# Patient Record
Sex: MALE | Race: WHITE | Employment: OTHER | URBAN - METROPOLITAN AREA
[De-identification: names, ages, dates, MRNs, and addresses within clinical notes are randomized per-mention and may not be internally consistent; named-entity substitution may affect disease eponyms.]

---

## 2022-11-09 ENCOUNTER — ANESTHESIA EVENT (OUTPATIENT)
Dept: ENDOSCOPY | Age: 77
End: 2022-11-09
Payer: MEDICARE

## 2022-11-10 ENCOUNTER — HOSPITAL ENCOUNTER (OUTPATIENT)
Age: 77
Setting detail: OUTPATIENT SURGERY
Discharge: HOME | End: 2022-11-10
Attending: INTERNAL MEDICINE | Admitting: INTERNAL MEDICINE
Payer: MEDICARE

## 2022-11-10 ENCOUNTER — ANESTHESIA (OUTPATIENT)
Dept: ENDOSCOPY | Age: 77
End: 2022-11-10
Payer: MEDICARE

## 2022-11-10 VITALS
BODY MASS INDEX: 31.83 KG/M2 | RESPIRATION RATE: 16 BRPM | TEMPERATURE: 96.4 F | HEIGHT: 72 IN | WEIGHT: 235 LBS | OXYGEN SATURATION: 96 % | DIASTOLIC BLOOD PRESSURE: 83 MMHG | HEART RATE: 54 BPM | SYSTOLIC BLOOD PRESSURE: 114 MMHG

## 2022-11-10 PROCEDURE — 2720000010 HC SURG SUPPLY STERILE: Performed by: INTERNAL MEDICINE

## 2022-11-10 PROCEDURE — 3609155800 HC ENTEROSCOPY WITH INTERVENTION: Performed by: INTERNAL MEDICINE

## 2022-11-10 PROCEDURE — 2500000003 HC RX 250 WO HCPCS: Performed by: NURSE ANESTHETIST, CERTIFIED REGISTERED

## 2022-11-10 PROCEDURE — 2580000003 HC RX 258: Performed by: FAMILY MEDICINE

## 2022-11-10 PROCEDURE — 6360000002 HC RX W HCPCS: Performed by: NURSE ANESTHETIST, CERTIFIED REGISTERED

## 2022-11-10 PROCEDURE — 3700000001 HC ADD 15 MINUTES (ANESTHESIA): Performed by: INTERNAL MEDICINE

## 2022-11-10 PROCEDURE — 2500000003 HC RX 250 WO HCPCS: Performed by: INTERNAL MEDICINE

## 2022-11-10 PROCEDURE — 3700000000 HC ANESTHESIA ATTENDED CARE: Performed by: INTERNAL MEDICINE

## 2022-11-10 PROCEDURE — 7100000010 HC PHASE II RECOVERY - FIRST 15 MIN: Performed by: INTERNAL MEDICINE

## 2022-11-10 PROCEDURE — 2709999900 HC NON-CHARGEABLE SUPPLY: Performed by: INTERNAL MEDICINE

## 2022-11-10 PROCEDURE — 7100000011 HC PHASE II RECOVERY - ADDTL 15 MIN: Performed by: INTERNAL MEDICINE

## 2022-11-10 RX ORDER — METOPROLOL SUCCINATE 50 MG/1
TABLET, EXTENDED RELEASE ORAL
COMMUNITY
Start: 2017-12-21

## 2022-11-10 RX ORDER — LISINOPRIL 5 MG/1
5 TABLET ORAL DAILY
COMMUNITY
Start: 2017-12-21

## 2022-11-10 RX ORDER — PROPOFOL 10 MG/ML
INJECTION, EMULSION INTRAVENOUS PRN
Status: DISCONTINUED | OUTPATIENT
Start: 2022-11-10 | End: 2022-11-10 | Stop reason: SDUPTHER

## 2022-11-10 RX ORDER — DIMENHYDRINATE 50 MG
TABLET ORAL
COMMUNITY

## 2022-11-10 RX ORDER — M-VIT,TX,IRON,MINS/CALC/FOLIC 27MG-0.4MG
1 TABLET ORAL DAILY
COMMUNITY

## 2022-11-10 RX ORDER — WARFARIN SODIUM 5 MG/1
10 TABLET ORAL DAILY
COMMUNITY
Start: 2018-06-19

## 2022-11-10 RX ORDER — LEVOTHYROXINE SODIUM 0.05 MG/1
50 TABLET ORAL DAILY
COMMUNITY
Start: 2018-05-31

## 2022-11-10 RX ORDER — SODIUM CHLORIDE, SODIUM LACTATE, POTASSIUM CHLORIDE, CALCIUM CHLORIDE 600; 310; 30; 20 MG/100ML; MG/100ML; MG/100ML; MG/100ML
INJECTION, SOLUTION INTRAVENOUS CONTINUOUS
Status: DISCONTINUED | OUTPATIENT
Start: 2022-11-10 | End: 2022-11-10 | Stop reason: HOSPADM

## 2022-11-10 RX ORDER — HYDROCHLOROTHIAZIDE 12.5 MG/1
TABLET ORAL
COMMUNITY
Start: 2022-09-26

## 2022-11-10 RX ORDER — DIPHENHYDRAMINE HCL 25 MG
CAPSULE ORAL
COMMUNITY

## 2022-11-10 RX ORDER — LIDOCAINE HYDROCHLORIDE 20 MG/ML
INJECTION, SOLUTION INFILTRATION; PERINEURAL PRN
Status: DISCONTINUED | OUTPATIENT
Start: 2022-11-10 | End: 2022-11-10 | Stop reason: SDUPTHER

## 2022-11-10 RX ORDER — PROPOFOL 10 MG/ML
INJECTION, EMULSION INTRAVENOUS CONTINUOUS PRN
Status: DISCONTINUED | OUTPATIENT
Start: 2022-11-10 | End: 2022-11-10 | Stop reason: SDUPTHER

## 2022-11-10 RX ORDER — CELECOXIB 200 MG/1
CAPSULE ORAL
COMMUNITY
Start: 2017-12-21

## 2022-11-10 RX ORDER — WARFARIN SODIUM 10 MG/1
10 TABLET ORAL DAILY
COMMUNITY
Start: 2022-01-10

## 2022-11-10 RX ORDER — FINASTERIDE 5 MG/1
TABLET, FILM COATED ORAL
COMMUNITY
Start: 2018-02-26

## 2022-11-10 RX ORDER — OXYBUTYNIN CHLORIDE 5 MG/1
TABLET ORAL
COMMUNITY
Start: 2021-12-27

## 2022-11-10 RX ORDER — SODIUM CHLORIDE 0.9 % (FLUSH) 0.9 %
5-40 SYRINGE (ML) INJECTION PRN
Status: DISCONTINUED | OUTPATIENT
Start: 2022-11-10 | End: 2022-11-10 | Stop reason: HOSPADM

## 2022-11-10 RX ORDER — ATORVASTATIN CALCIUM 40 MG/1
TABLET, FILM COATED ORAL
COMMUNITY
Start: 2018-05-03

## 2022-11-10 RX ORDER — SODIUM CHLORIDE 0.9 % (FLUSH) 0.9 %
5-40 SYRINGE (ML) INJECTION EVERY 12 HOURS SCHEDULED
Status: DISCONTINUED | OUTPATIENT
Start: 2022-11-10 | End: 2022-11-10 | Stop reason: HOSPADM

## 2022-11-10 RX ORDER — SODIUM CHLORIDE 9 MG/ML
INJECTION, SOLUTION INTRAVENOUS PRN
Status: DISCONTINUED | OUTPATIENT
Start: 2022-11-10 | End: 2022-11-10 | Stop reason: HOSPADM

## 2022-11-10 RX ORDER — TAMSULOSIN HYDROCHLORIDE 0.4 MG/1
CAPSULE ORAL
COMMUNITY
Start: 2017-12-21

## 2022-11-10 RX ADMIN — LIDOCAINE HYDROCHLORIDE 100 MG: 20 INJECTION, SOLUTION INFILTRATION; PERINEURAL at 08:07

## 2022-11-10 RX ADMIN — SODIUM CHLORIDE, POTASSIUM CHLORIDE, SODIUM LACTATE AND CALCIUM CHLORIDE: 600; 310; 30; 20 INJECTION, SOLUTION INTRAVENOUS at 06:45

## 2022-11-10 RX ADMIN — PROPOFOL 30 MG: 10 INJECTION, EMULSION INTRAVENOUS at 08:07

## 2022-11-10 RX ADMIN — PROPOFOL 180 MCG/KG/MIN: 10 INJECTION, EMULSION INTRAVENOUS at 08:07

## 2022-11-10 ASSESSMENT — PAIN - FUNCTIONAL ASSESSMENT: PAIN_FUNCTIONAL_ASSESSMENT: NONE - DENIES PAIN

## 2022-11-10 ASSESSMENT — LIFESTYLE VARIABLES: SMOKING_STATUS: 0

## 2022-11-10 NOTE — DISCHARGE INSTRUCTIONS
ENDOSCOPY DISCHARGE INSTRUCTIONS:    Call the physician that did your procedure for any questions or concern:    [facility]: [phone]  DR. ENRIQUE DUARTE      ACTIVITY:    There are potential side effects to the medications used for sedation and anesthesia during your procedure. These include:  Dizziness or light-headedness, confusion or memory loss, delayed reaction times, loss of coordination, nausea and vomiting. Because of your increased risk for injury, we ask that you observe the following precautions: For the next 24 hours,  DO NOT operate an automobile, bicycle, motorcycle, , power tools or large equipment of any kind. Do not drink alcohol, sign any legal documents or make any legal decisions for 24 hours. Do not bend your head over lower than your heart. DO sit on the side of bed/couch awhile before getting up. Plan on bedrest or quiet relaxation today. You may resume normal activities in 24 hours. DIET:    Your first meal today should be light, avoiding spicy and fatty foods. If you tolerate this first meal, then you may advance to your regular diet unless otherwise advised by your physician. NORMAL SYMPTOMS:  -Mild sore throat if youve had an EGD   -Gaseous discomfort    NOTIFY YOUR PHYSICIAN IF THESE SYMPTOMS OCCUR:  1. Fever (greater than 100)  5. Increased abdominal bloating  2. Severe pain    6. Excessive bleeding  3. Nausea and vomiting  7. Chest pain                                                                    4. Chills    8. 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 - Large Hiatal Hernia     Recommendations  Monitor for anemia. Follow up with Dr. Mayito Dodson.   Restart Coumadin 11/11      Please review these discharge instructions this evening or tomorrow for  information you may have forgotten. We want to thank you for choosing the [facility] at the [facility] as your health care provider. We always strive to provide you with excellent care while you are here. You may receive a survey in the mail regarding your care. We would appreciate you taking a few minutes of your time to complete this survey.

## 2022-11-10 NOTE — H&P
History and Physical / Pre-Sedation Assessment      PMHx:   Past Medical History:   Diagnosis Date    Afib ([facility] [address]     Arthritis     Cancer ([facility] [address]     skin cancer behind left ear. Cerebral artery occlusion with cerebral infarction [facility])     History of back surgery     lower back/lumbar 4&5th    Hypertension     Kidney stones        Medications:   Prior to Admission medications    Medication Sig Start Date End Date Taking? Authorizing Provider   atorvastatin (LIPITOR) 40 MG tablet atorvastatin 40 mg tablet   Take 1 tablet every day by oral route. 5/3/18  Yes Historical Provider, MD   celecoxib (CELEBREX) 200 MG capsule Celebrex 200 mg capsule   Take 1 capsule every day by oral route. 12/21/17  Yes Historical Provider, MD   finasteride (PROSCAR) 5 MG tablet finasteride 5 mg tablet   Take 1 tablet every day by oral route in the evening. 2/26/18  Yes Historical Provider, MD   levothyroxine (SYNTHROID) 50 MCG tablet Take 50 mcg by mouth daily 5/31/18  Yes Historical Provider, MD   lisinopril (PRINIVIL;ZESTRIL) 5 MG tablet Take 5 mg by mouth daily 12/21/17  Yes Historical Provider, MD   metoprolol succinate (TOPROL XL) 50 MG extended release tablet TAKE ONE TABLET BY MOUTH DAILY 12/21/17  Yes Historical Provider, MD   oxybutynin (DITROPAN) 5 MG tablet oxybutynin chloride 5 mg tablet   Take 1 tablet every day by oral route. 12/27/21  Yes Historical Provider, MD   tamsulosin (FLOMAX) 0.4 MG capsule tamsulosin 0.4 mg capsule   Take 1 capsule every day by oral route.  12/21/17  Yes Historical Provider, MD   warfarin (COUMADIN) 10 MG tablet Take 10 mg by mouth daily Taken Sun,Tues, Wed, Thurs, Sat. 1/10/22  Yes Historical Provider, MD   warfarin (COUMADIN) 5 MG tablet Take 10 mg by mouth daily Monday and Friday 6/19/18  Yes Historical Provider, MD   Multiple Vitamins-Minerals (THERAPEUTIC MULTIVITAMIN-MINERALS) tablet Take 1 tablet by mouth daily   Yes Historical Provider, MD   Cholecalciferol (VITAMIN D3) 125 MCG (5000 UT) TABS Take by mouth   Yes Historical Provider, MD   Coenzyme Q10 (CO Q-10) 100 MG CAPS Take by mouth   Yes Historical Provider, MD   hydroCHLOROthiazide (HYDRODIURIL) 12.5 MG tablet  9/26/22   Historical Provider, MD   diphenhydrAMINE (BENADRYL) 25 MG capsule Benadryl 25 mg capsule   Take 1 capsule every day by oral route. Historical Provider, MD       Allergies: No Known Allergies    PSHx:   Past Surgical History:   Procedure Laterality Date    ORTHOPEDIC SURGERY Bilateral     right ankle has screws       Social Hx:   Social History     Socioeconomic History    Marital status:      Spouse name: Not on file    Number of children: Not on file    Years of education: Not on file    Highest education level: Not on file   Occupational History    Not on file   Tobacco Use    Smoking status: Never    Smokeless tobacco: Never   Vaping Use    Vaping Use: Never used   Substance and Sexual Activity    Alcohol use: Never    Drug use: Never    Sexual activity: Not on file   Other Topics Concern    Not on file   Social History Narrative    Not on file     Social Determinants of Health     Financial Resource Strain: Not on file   Food Insecurity: Not on file   Transportation Needs: Not on file   Physical Activity: Not on file   Stress: Not on file   Social Connections: Not on file   Intimate Partner Violence: Not on file   Housing Stability: Not on file       Family Hx:   History reviewed. No pertinent family history.     Physical Exam:  Vital Signs: BP (!) 133/93   Pulse 65   Temp 97.2 °F (36.2 °C) (Temporal)   Resp 16   Ht 6' (1.829 m)   Wt 235 lb (106.6 kg)   SpO2 100%   BMI 31.87 kg/m²    Airway: Mallampati: II (soft palate, uvula, fauces visible)  Pulmonary:Normal  Cardiac:Normal  Abdomen:Normal    Pre-Procedure Assessment / Plan:  ASA: Class 2 - A normal healthy patient with mild systemic disease  Level of Sedation Plan:Deep sedation  Post Procedure plan: Return to same level of care    I assessed the patient and find that the patient is in satisfactory condition to proceed with the planned procedure and sedation plan. I have explained the risk, benefits, and alternatives to the procedure; the patient understands and agrees to proceed.        Robert Lr MD  11/10/2022

## 2022-11-10 NOTE — PROGRESS NOTES
Ambulatory Surgery/Procedure Discharge Note    Vitals:    11/10/22 0945   BP: 114/83   Pulse:    Resp:    Temp:    SpO2:    Pt met discharge criteria per melanie score. HX of Afib. HR 50-70    In: 820 [P.O.:120; I.V.:700]  Out: -     Restroom use offered before discharge. No    Pain assessment:  none     Patient discharged to home/self care. Patient discharged via wheel chair by transporter to waiting family/S.O.       11/10/2022 10:03 AM Pt and S.O./family states \"ready to go home\". Pt alert and oriented x4. IV removed. Denies N/V or pain. Discharge instructions given to pt and wife with pt permission. Pt and wife verbalized understanding of all instructions. Left with all belongings and discharge instructions.

## 2022-11-10 NOTE — ANESTHESIA POSTPROCEDURE EVALUATION
Department of Anesthesiology  Postprocedure Note    Patient: Claritza Bryan  MRN: [id number]  YOB: 1945  Date of evaluation: 11/10/2022      Procedure Summary     Date: 11/10/22 Room / Location: [facility] / [facility]    Anesthesia Start: 0804 Anesthesia Stop: 0848    Procedure: ENTEROSCOPY WITH INTERVENTION Diagnosis:       Anemia, unspecified type      (Anemia, unspecified type [D64.9])    Surgeons: Kuldeep Monge MD Responsible Provider: Ayush Hardin DO    Anesthesia Type: MAC ASA Status: 3          Anesthesia Type: No value filed.     Maciej Phase I: Maciej Score: 10    Maciej Phase II: Maciej Score: 7      Anesthesia Post Evaluation    Patient location during evaluation: PACU  Patient participation: complete - patient participated  Level of consciousness: awake and alert  Pain score: 0  Airway patency: patent  Nausea & Vomiting: no nausea and no vomiting  Complications: no  Cardiovascular status: hemodynamically stable  Respiratory status: acceptable  Hydration status: stable

## 2022-11-10 NOTE — PROGRESS NOTES
Pt arrived from Endo procedure room at [phone] with an oral airway. It was removed at 0900. He tolerated it well and oxygen levels remained 95-97.

## 2022-11-10 NOTE — ANESTHESIA PRE PROCEDURE
Department of Anesthesiology  Preprocedure Note       Name:  Elias Madrid   Age:  69 y.o.  :  1945                                          MRN:  [id number]         Date:  11/10/2022      Surgeon: Zakia Rainey):  Justice Jefferson MD    Procedure: Procedure(s):  ENTEROSCOPY, SINGLE BALLOON WITH BIOPSY    Medications prior to admission:   Prior to Admission medications    Medication Sig Start Date End Date Taking? Authorizing Provider   atorvastatin (LIPITOR) 40 MG tablet atorvastatin 40 mg tablet   Take 1 tablet every day by oral route. 5/3/18  Yes Historical Provider, MD   celecoxib (CELEBREX) 200 MG capsule Celebrex 200 mg capsule   Take 1 capsule every day by oral route. 17  Yes Historical Provider, MD   finasteride (PROSCAR) 5 MG tablet finasteride 5 mg tablet   Take 1 tablet every day by oral route in the evening. 18  Yes Historical Provider, MD   levothyroxine (SYNTHROID) 50 MCG tablet Take 50 mcg by mouth daily 18  Yes Historical Provider, MD   lisinopril (PRINIVIL;ZESTRIL) 5 MG tablet Take 5 mg by mouth daily 17  Yes Historical Provider, MD   metoprolol succinate (TOPROL XL) 50 MG extended release tablet TAKE ONE TABLET BY MOUTH DAILY 17  Yes Historical Provider, MD   oxybutynin (DITROPAN) 5 MG tablet oxybutynin chloride 5 mg tablet   Take 1 tablet every day by oral route. 21  Yes Historical Provider, MD   tamsulosin (FLOMAX) 0.4 MG capsule tamsulosin 0.4 mg capsule   Take 1 capsule every day by oral route.  17  Yes Historical Provider, MD   warfarin (COUMADIN) 10 MG tablet Take 10 mg by mouth daily Taken Sun,Tues, Wed, Thurs, Sat. 1/10/22  Yes Historical Provider, MD   warfarin (COUMADIN) 5 MG tablet Take 10 mg by mouth daily Monday and 18  Yes Historical Provider, MD   Multiple Vitamins-Minerals (THERAPEUTIC MULTIVITAMIN-MINERALS) tablet Take 1 tablet by mouth daily   Yes Historical Provider, MD   Cholecalciferol (VITAMIN D3) 125 MCG (5000 UT) TABS Take by mouth   Yes Historical Provider, MD   Coenzyme Q10 (CO Q-10) 100 MG CAPS Take by mouth   Yes Historical Provider, MD   hydroCHLOROthiazide (HYDRODIURIL) 12.5 MG tablet  9/26/22   Historical Provider, MD   diphenhydrAMINE (BENADRYL) 25 MG capsule Benadryl 25 mg capsule   Take 1 capsule every day by oral route. Historical Provider, MD       Current medications:    Current Facility-Administered Medications   Medication Dose Route Frequency Provider Last Rate Last Admin    lactated ringers infusion   IntraVENous Continuous Zoey Gabriel  mL/hr at 11/10/22 0645 New Bag at 11/10/22 0645    sodium chloride flush 0.9 % injection 5-40 mL  5-40 mL IntraVENous 2 times per day Zoey Gabriel MD        sodium chloride flush 0.9 % injection 5-40 mL  5-40 mL IntraVENous PRN Zoey Gabriel MD        0.9 % sodium chloride infusion   IntraVENous PRN Zoey Gabriel MD           Allergies:  No Known Allergies    Problem List:  There is no problem list on file for this patient. Past Medical History:        Diagnosis Date    Afib ([facility] [address]     Arthritis     Cancer ([facility] [address]     skin cancer behind left ear.     Cerebral artery occlusion with cerebral infarction ([facility] [address]     History of back surgery     lower back/lumbar 4&5th    Hypertension     Kidney stones        Past Surgical History:        Procedure Laterality Date    ORTHOPEDIC SURGERY Bilateral     right ankle has screws       Social History:    Social History     Tobacco Use    Smoking status: Never    Smokeless tobacco: Never   Substance Use Topics    Alcohol use: Never                                Counseling given: Not Answered      Vital Signs (Current):   Vitals:    11/10/22 0624   BP: (!) 133/93   Pulse: 65   Resp: 16   Temp: 97.2 °F (36.2 °C)   TempSrc: Temporal   SpO2: 100%   Weight: 235 lb (106.6 kg)   Height: 6' (1.829 m)                                              BP Readings from Last 3 Encounters:   11/10/22 (!) 133/93       NPO Status: Time of last liquid consumption: 2359                        Time of last solid consumption: 2359                        Date of last liquid consumption: 11/09/22                        Date of last solid food consumption: 11/09/22    BMI:   Wt Readings from Last 3 Encounters:   11/10/22 235 lb (106.6 kg)     Body mass index is 31.87 kg/m². CBC: No results found for: WBC, RBC, HGB, HCT, MCV, RDW, PLT    CMP: No results found for: NA, K, CL, CO2, BUN, CREATININE, GFRAA, AGRATIO, LABGLOM, GLUCOSE, GLU, PROT, CALCIUM, BILITOT, ALKPHOS, AST, ALT    POC Tests: No results for input(s): POCGLU, POCNA, POCK, POCCL, POCBUN, POCHEMO, POCHCT in the last 72 hours. Coags: No results found for: PROTIME, INR, APTT    HCG (If Applicable): No results found for: PREGTESTUR, PREGSERUM, HCG, HCGQUANT     ABGs: No results found for: PHART, PO2ART, EKC1RAB, GOS6HKB, BEART, I0CIELGS     Type & Screen (If Applicable):  No results found for: LABABO, LABRH    Drug/Infectious Status (If Applicable):  No results found for: HIV, HEPCAB    COVID-19 Screening (If Applicable): No results found for: COVID19        Anesthesia Evaluation  Patient summary reviewed and Nursing notes reviewed no history of anesthetic complications:   Airway: Mallampati: II  TM distance: >3 FB   Neck ROM: full  Mouth opening: > = 3 FB   Dental: normal exam   (+) bridge  Comment: Upper bridge      Pulmonary: breath sounds clear to auscultation      (-) not a current smoker (remote)                           Cardiovascular:  Exercise tolerance: good (>4 METS),   (+) hypertension: moderate,     (-) past MI    NYHA Classification: II    Rhythm: regular  Rate: normal           Beta Blocker:  Not on Beta Blocker         Neuro/Psych:   (+) CVA (2018 no residual      coumadin stopped Saturday ):,             GI/Hepatic/Renal:        (-) GERD       Endo/Other: Negative Endo/Other ROS                    Abdominal:             Vascular: negative vascular ROS. Other Findings:           Anesthesia Plan      MAC     ASA 3       Induction: intravenous. MIPS: Prophylactic antiemetics administered. Anesthetic plan and risks discussed with patient. Plan discussed with CRNA.     Attending anesthesiologist reviewed and agrees with Preprocedure content                Juan Miguel Segundo DO   11/10/2022

## 2022-11-10 NOTE — OP NOTE
Procedure  Single Balloon Enteroscopy with APC  Indications  Small Bowel AVMs on capsule study  Consent  The anesthesia and procedure along with the risks, benefits and alternatives of each were explained by the physician  and nurse to the patient to their satisfaction and ample opportunities to ask questions was provided. We discussed  the risks of bleeding, perforation, anesthesia reaction as well as the alternatives, and missed lesions. Verbal and  written consent were obtained with the nurses present. Monitoring  The patient was monitored with continuous pulse oximetry, heart rate monitoring and intermittent blood pressure  monitoring throughout the procedure. Procedure Medications  MAC  Details of Procedure  The Olympus Single Balloon enteroscope was inserted atraumatically into the esophagus and advanced under direct vision to the proximal ileum using the described technique. The scope was slowly withdrawn carefully inspecting the ileum, jejunum, duodenum, entire stomach in both forward and retroflexed views and esophagus. Several  non-bleeding AVMs were noted in the jejunum. These were successfully treated using APC. Large hiatal Hernia noted. 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 - Large Hiatal Hernia    Recommendations  Monitor for anemia. Follow up with Dr. Jonathon Mari. Thank you and please let me know if there are any additional questions or concerns.      Dm Mercado

## (undated) DEVICE — FIAPC® PROBE W/ FILTER 3000 A OD 2.3MM/6.9FR; L 3M/9.8FT: Brand: ERBE

## (undated) DEVICE — SINGLE USE SPLINTING TUBE: Brand: SINGLE USE SPLINTING TUBE

## (undated) DEVICE — CANNULA SAMP CO2 AD GRN 7FT CO2 AND 7FT O2 TBNG UNIV CONN

## (undated) DEVICE — ERBE NESSY®PLATE 170 SPLIT; 168CM²; CABLE 3M: Brand: ERBE